# Patient Record
Sex: FEMALE | Race: WHITE
[De-identification: names, ages, dates, MRNs, and addresses within clinical notes are randomized per-mention and may not be internally consistent; named-entity substitution may affect disease eponyms.]

---

## 2022-06-22 ENCOUNTER — HOSPITAL ENCOUNTER (EMERGENCY)
Dept: HOSPITAL 76 - ED | Age: 72
Discharge: HOME | End: 2022-06-22
Payer: MEDICARE

## 2022-06-22 VITALS — SYSTOLIC BLOOD PRESSURE: 207 MMHG | DIASTOLIC BLOOD PRESSURE: 113 MMHG

## 2022-06-22 DIAGNOSIS — I16.0: Primary | ICD-10-CM

## 2022-06-22 LAB
ALBUMIN DIAFP-MCNC: 4.6 G/DL (ref 3.2–5.5)
ALBUMIN/GLOB SERPL: 1.3 {RATIO} (ref 1–2.2)
ALP SERPL-CCNC: 85 IU/L (ref 42–121)
ALT SERPL W P-5'-P-CCNC: 26 IU/L (ref 10–60)
ANION GAP SERPL CALCULATED.4IONS-SCNC: 8 MMOL/L (ref 6–13)
AST SERPL W P-5'-P-CCNC: 31 IU/L (ref 10–42)
BASOPHILS NFR BLD AUTO: 0 10^3/UL (ref 0–0.1)
BASOPHILS NFR BLD AUTO: 0.7 %
BILIRUB BLD-MCNC: 0.9 MG/DL (ref 0.2–1)
BUN SERPL-MCNC: 22 MG/DL (ref 6–20)
CALCIUM UR-MCNC: 9.5 MG/DL (ref 8.5–10.3)
CHLORIDE SERPL-SCNC: 98 MMOL/L (ref 101–111)
CO2 SERPL-SCNC: 31 MMOL/L (ref 21–32)
CREAT SERPLBLD-SCNC: 0.8 MG/DL (ref 0.4–1)
EOSINOPHIL # BLD AUTO: 0.2 10^3/UL (ref 0–0.7)
EOSINOPHIL NFR BLD AUTO: 3.3 %
ERYTHROCYTE [DISTWIDTH] IN BLOOD BY AUTOMATED COUNT: 12 % (ref 12–15)
GFRSERPLBLD MDRD-ARVRAT: 71 ML/MIN/{1.73_M2} (ref 89–?)
GLOBULIN SER-MCNC: 3.5 G/DL (ref 2.1–4.2)
GLUCOSE SERPL-MCNC: 101 MG/DL (ref 70–100)
HCT VFR BLD AUTO: 46.5 % (ref 37–47)
HGB UR QL STRIP: 15.6 G/DL (ref 12–16)
LYMPHOCYTES # SPEC AUTO: 1.8 10^3/UL (ref 1.5–3.5)
LYMPHOCYTES NFR BLD AUTO: 29.6 %
MCH RBC QN AUTO: 30.1 PG (ref 27–31)
MCHC RBC AUTO-ENTMCNC: 33.5 G/DL (ref 32–36)
MCV RBC AUTO: 89.8 FL (ref 81–99)
MONOCYTES # BLD AUTO: 0.6 10^3/UL (ref 0–1)
MONOCYTES NFR BLD AUTO: 9.6 %
NEUTROPHILS # BLD AUTO: 3.5 10^3/UL (ref 1.5–6.6)
NEUTROPHILS # SNV AUTO: 6.2 X10^3/UL (ref 4.8–10.8)
NEUTROPHILS NFR BLD AUTO: 56.5 %
NRBC # BLD AUTO: 0 /100WBC
NRBC # BLD AUTO: 0 X10^3/UL
PDW BLD AUTO: 9.1 FL (ref 7.9–10.8)
PLATELET # BLD: 288 10^3/UL (ref 130–450)
POTASSIUM SERPL-SCNC: 4.2 MMOL/L (ref 3.5–5)
PROT SPEC-MCNC: 8.1 G/DL (ref 6.7–8.2)
RBC MAR: 5.18 10^6/UL (ref 4.2–5.4)
SODIUM SERPLBLD-SCNC: 137 MMOL/L (ref 135–145)

## 2022-06-22 PROCEDURE — 96374 THER/PROPH/DIAG INJ IV PUSH: CPT

## 2022-06-22 PROCEDURE — 84484 ASSAY OF TROPONIN QUANT: CPT

## 2022-06-22 PROCEDURE — 80053 COMPREHEN METABOLIC PANEL: CPT

## 2022-06-22 PROCEDURE — 36415 COLL VENOUS BLD VENIPUNCTURE: CPT

## 2022-06-22 PROCEDURE — 99284 EMERGENCY DEPT VISIT MOD MDM: CPT

## 2022-06-22 PROCEDURE — 85025 COMPLETE CBC W/AUTO DIFF WBC: CPT

## 2022-06-22 PROCEDURE — 71045 X-RAY EXAM CHEST 1 VIEW: CPT

## 2022-06-22 PROCEDURE — 93005 ELECTROCARDIOGRAM TRACING: CPT

## 2022-06-22 PROCEDURE — 70450 CT HEAD/BRAIN W/O DYE: CPT

## 2022-06-22 NOTE — CT REPORT
PROCEDURE:  HEAD WO

 

INDICATIONS:  headache/HTN

 

TECHNIQUE:  

Noncontrast 4.5 mm thick angled axial sections acquired from the foramen magnum to the vertex.  For r
adiation dose reduction, the following was used:  automated exposure control, adjustment of mA and/or
 kV according to patient size.

 

COMPARISON:  None.

 

FINDINGS:  

Image quality:  Excellent.  

 

The ventricular system and cortical sulci demonstrate atrophy, consistent for patient's stated age.  
There are areas of hypodensity in the periventricular and subcortical white matter.  There is no acut
e intra or extra-axial fluid collection.  No acute hemorrhage, mass lesion or midline shift.  Brainst
em is unremarkable.  

Globes are symmetrical. Sinuses are aerated. Osseous structures are intact. 

 .  

 

IMPRESSION:  

 

1.  No acute intracranial process.

2.  Moderate atrophy and chronic microvascular ischemic changes.

 

Reviewed by: Saba Bhardwaj MD on 6/22/2022 8:21 PM PDT

Approved by: Saba Bhardwaj MD on 6/22/2022 8:21 PM PDT

 

 

Station ID:  [id number]

## 2022-06-22 NOTE — ED PHYSICIAN DOCUMENTATION
History of Present Illness





- Stated complaint


Stated Complaint: DIZZY/HBP





- Chief complaint


Chief Complaint: General





- History obtained from


History obtained from: Patient





- Additonal information


Additional information: 


Patient with no significant past medical history presenting for evaluation of 

dizziness and lightheadedness that has been intermittent for the last week along

with a mild frontal headache.  She went to the walk-in clinic for evaluation and

was noted to have significantly elevated blood pressure readings.  Patient 

reports going to a dental appointment a few years ago and had high blood 

pressure at that time but thought it was related to running late.  She has not 

previously been diagnosed with hypertension.  She denies visual disturbances, 

balance issues, chest pain, difficulty breathing, focal weakness, abdominal pain

or vomiting.








Review of Systems


Eyes: reports: Discharge.  denies: Decreased vision


Nose: denies: Congestion


Cardiac: denies: Chest pain / pressure


Respiratory: denies: Dyspnea


GI: denies: Vomiting


: denies: Dysuria


Musculoskeletal: denies: Neck pain, Back pain


Neurologic: reports: Headache.  denies: Focal weakness, Syncope





PD PAST MEDICAL HISTORY





- Present Medications


Home Medications: 


                                Ambulatory Orders











 Medication  Instructions  Recorded  Confirmed


 


amLODIPine [Norvasc] 10 mg PO DAILY #30 tablet 06/22/22 














- Allergies


Allergies/Adverse Reactions: 


                                    Allergies











Allergy/AdvReac Type Severity Reaction Status Date / Time


 


Sulfa (Sulfonamide AdvReac  Hives Verified 06/22/22 19:43





Antibiotics)     














PD ED PE NORMAL





- General


General: Alert and oriented X 3, No acute distress, Well developed/nourished





- HEENT


HEENT: Atraumatic, PERRL, EOMI





- Neck


Neck: Supple, no meningeal sign





- Cardiac


Cardiac: RRR, No murmur, Strong equal pulses





- Respiratory


Respiratory: No respiratory distress, Clear bilaterally





- Abdomen


Abdomen: Normal bowel sounds, Soft, Non tender, Non distended





- Back


Back: No CVA TTP





- Derm


Derm: Warm and dry





- Extremities


Extremities: No edema





- Neuro


Neuro: Alert and oriented X 3, CNs 2-12 intact, No motor deficit, No sensory 

deficit, Normal speech, Other (Normal finger-to-nose, rapid alternating 

movements, normal gait)





- Psych


Psych: Normal mood





Results





- Vitals


Vitals: 


                               Vital Signs - 24 hr











  06/22/22 06/22/22 06/22/22





  19:35 20:30 20:42


 


Temperature 36.5 C  


 


Heart Rate 86 86 72


 


Respiratory 14 20 17





Rate   


 


Blood Pressure 265/130 H 236/121 H 190/115 H


 


O2 Saturation 97 97 99














  06/22/22 06/22/22 06/22/22





  20:45 20:51 21:20


 


Temperature   


 


Heart Rate 72 74 69


 


Respiratory 20 15 17





Rate   


 


Blood Pressure 212/122 H 197/117 H 212/117 H


 


O2 Saturation 99 97 99














  06/22/22 06/22/22





  21:30 22:06


 


Temperature  


 


Heart Rate 68 68


 


Respiratory 14 32 H





Rate  


 


Blood Pressure 210/110 H 207/113 H


 


O2 Saturation 99 98








                                     Oxygen











O2 Source                      Room air

















- EKG (time done)


  ** 1958


Rate: Rate (enter#) (79)


Rhythm: NSR


Intervals: No: Prolonged QT


Ischemia: No: ST elevation c/w ischemia





- Labs


Labs: 


                                Laboratory Tests











  06/22/22 06/22/22 06/22/22





  20:20 20:20 20:20


 


WBC  6.2  


 


RBC  5.18  


 


Hgb  15.6  


 


Hct  46.5  


 


MCV  89.8  


 


MCH  30.1  


 


MCHC  33.5  


 


RDW  12.0  


 


Plt Count  288  


 


MPV  9.1  


 


Neut # (Auto)  3.5  


 


Lymph # (Auto)  1.8  


 


Mono # (Auto)  0.6  


 


Eos # (Auto)  0.2  


 


Baso # (Auto)  0.0  


 


Absolute Nucleated RBC  0.00  


 


Nucleated RBC %  0.0  


 


Sodium   137 


 


Potassium   4.2 


 


Chloride   98 L 


 


Carbon Dioxide   31 


 


Anion Gap   8.0 


 


BUN   22 H 


 


Creatinine   0.8 


 


Estimated GFR (MDRD)   71 L 


 


Glucose   101 H 


 


Calcium   9.5 


 


Total Bilirubin   0.9 


 


AST   31 


 


ALT   26 


 


Alkaline Phosphatase   85 


 


Troponin I High Sens    7.7


 


Total Protein   8.1 


 


Albumin   4.6 


 


Globulin   3.5 


 


Albumin/Globulin Ratio   1.3 














PD MEDICAL DECISION MAKING





- ED course


Complexity details: reviewed results, re-evaluated patient, d/w patient


ED course: 


Patient presenting for evaluation with severely elevated blood pressure macie

dings.  On exam, patient has no focal deficits and has no abnormal findings on 

cerebellar testing.CT scan was obtained and is negative for acute intracranial 

bleed.Labs also reviewed without significant abnormalities.  No signs of cardiac

ischemia.  Patient does not appear to be in heart failure.No signs of 

hypertensive emergency.  Patient was given a dose of labetalol with ~20% 

Lowering of blood pressure. Patient to be started on amlodipine.  She is aware 

of need for close follow-up with primary care doctor.  She is aware of strict 

return precautions.She is asymptomatic at time of discharge.








Departure





- Departure


Disposition: 01 Home, Self Care


Clinical Impression: 


 Hypertensive urgency





Condition: Stable


Instructions:  ED Hypertension New Begin Tx


Follow-Up: 


Ash Rendon MD [Physician No Access] - 


Prescriptions: 


amLODIPine [Norvasc] 10 mg PO DAILY #30 tablet


Comments: 


You were evaluated for significantly elevated blood pressure. This is related to

 a condition called hypertension. We have checked labs as well as a CT of your 

brain and chest xray with no significant abnormalities. We have started to lower

 Your blood pressure.  We do not want to lower your blood pressure too quickly 

and it should slowly lower over the next several days.I will send a prescription

 for blood pressure medication called amlodipine to the [facility] pharmacy in [place].  Please  this prescription and start taking it every day as 

prescribed.  You do also need close follow-up with your primary care doctor. I 

have included the name of her primary care doctor you can call tomorrow to try 

and establish care with.  You can also try at the walk-in clinic and should be 

seen within the next week.  If you have any worsening symptoms such as headache,

 weakness, dizziness, chest pain or trouble breathing please return to the 

emergency department. 


Discharge Date/Time: 06/22/22 22:10

## 2022-06-22 NOTE — XRAY REPORT
PROCEDURE:  Chest 1 View X-Ray

 

INDICATIONS:  dizzy

 

TECHNIQUE:  One view of the chest was acquired.  

 

COMPARISON:  None

 

FINDINGS:  

 

Surgical changes and devices:  None.  

 

Lungs and pleura:  No pleural effusions or pneumothorax.  Lungs are clear.  

 

Mediastinum:  Mediastinal contours appear normal.  Heart size is enlarged.

 

Bones and chest wall:  No suspicious bony lesions.  Overlying soft tissues appear unremarkable.  

 

IMPRESSION:  

No acute pulmonary process.

 

Reviewed by: Saba Bhardwaj MD on 6/22/2022 8:20 PM PDT

Approved by: Saba Bhardwaj MD on 6/22/2022 8:20 PM PDT

 

 

Station ID:  [id number]

## 2023-02-14 ENCOUNTER — HOSPITAL ENCOUNTER (OUTPATIENT)
Dept: HOSPITAL 76 - LAB.N | Age: 73
Discharge: HOME | End: 2023-02-14
Attending: NURSE PRACTITIONER
Payer: MEDICARE

## 2023-02-14 DIAGNOSIS — I10: Primary | ICD-10-CM

## 2023-02-14 DIAGNOSIS — Z13.220: ICD-10-CM

## 2023-02-14 LAB
CHOLEST SERPL-MCNC: 200 MG/DL
HDLC SERPL-MCNC: 82 MG/DL
HDLC SERPL: 2.4 {RATIO} (ref ?–4.4)
T4 FREE SERPL-MCNC: 0.62 NG/DL (ref 0.58–1.64)
TRIGL P FAST SERPL-MCNC: 36 MG/DL
TSH SERPL-ACNC: 8.2 UIU/ML (ref 0.34–5.6)

## 2023-02-14 PROCEDURE — 84443 ASSAY THYROID STIM HORMONE: CPT

## 2023-02-14 PROCEDURE — 84439 ASSAY OF FREE THYROXINE: CPT

## 2023-02-14 PROCEDURE — 80061 LIPID PANEL: CPT

## 2023-02-14 PROCEDURE — 36415 COLL VENOUS BLD VENIPUNCTURE: CPT

## 2023-02-14 PROCEDURE — 83721 ASSAY OF BLOOD LIPOPROTEIN: CPT

## 2023-02-28 ENCOUNTER — HOSPITAL ENCOUNTER (OUTPATIENT)
Dept: HOSPITAL 76 - DI | Age: 73
Discharge: HOME | End: 2023-02-28
Attending: NURSE PRACTITIONER
Payer: MEDICARE

## 2023-02-28 DIAGNOSIS — Q21.12: ICD-10-CM

## 2023-02-28 DIAGNOSIS — I87.8: Primary | ICD-10-CM

## 2023-02-28 DIAGNOSIS — I10: ICD-10-CM

## 2023-02-28 PROCEDURE — 93306 TTE W/DOPPLER COMPLETE: CPT

## 2023-06-22 ENCOUNTER — HOSPITAL ENCOUNTER (OUTPATIENT)
Dept: HOSPITAL 76 - EMS | Age: 73
Discharge: ANOTHER HOSPITAL | End: 2023-06-22
Payer: MEDICARE
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
## 2023-06-22 DIAGNOSIS — M25.551: ICD-10-CM
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
## 2023-06-22 DIAGNOSIS — M25.531: ICD-10-CM

## 2023-06-22 DIAGNOSIS — S70.11XA: ICD-10-CM
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
## 2023-06-22 DIAGNOSIS — S00.83XA: Primary | ICD-10-CM

## 2023-08-01 ENCOUNTER — HOSPITAL ENCOUNTER (OUTPATIENT)
Dept: HOSPITAL 76 - DI.WOS | Age: 73
Discharge: HOME | End: 2023-08-01
Attending: ORTHOPAEDIC SURGERY
Payer: MEDICARE

## 2023-08-01 DIAGNOSIS — S52.501D: Primary | ICD-10-CM

## 2023-08-01 NOTE — XRAY REPORT
PROCEDURE:  Wrist 3 View RT

 

INDICATIONS: RIGHT WRIST PAIN

 

TECHNIQUE:  3 views of the wrist were acquired.  

 

COMPARISON:  6/22/2023

 

FINDINGS:  

 

Bones:  Healing impacted distal radius fracture with increased fracture lucency indicate prior study.


 

Soft tissues:  No suspicious soft tissue calcifications or masses.  

 

 

IMPRESSION:  

Healing impacted distal radius fracture. No new fractures.

 

 

 

Reviewed by: Mark Anthony Dumont MD on 8/1/2023 6:16 PM PDT

Approved by: Mark Anthony Dumont MD on 8/1/2023 6:16 PM PDT

 

 

Station ID:  [id number]

## 2024-09-24 ENCOUNTER — HOSPITAL ENCOUNTER (OUTPATIENT)
Dept: HOSPITAL 76 - SDS | Age: 74
Discharge: HOME | End: 2024-09-24
Attending: SURGERY
Payer: MEDICARE

## 2024-09-24 VITALS — OXYGEN SATURATION: 98 %

## 2024-09-24 VITALS — SYSTOLIC BLOOD PRESSURE: 154 MMHG | DIASTOLIC BLOOD PRESSURE: 82 MMHG

## 2024-09-24 DIAGNOSIS — K62.89: ICD-10-CM

## 2024-09-24 DIAGNOSIS — R19.5: ICD-10-CM

## 2024-09-24 DIAGNOSIS — Z12.11: Primary | ICD-10-CM

## 2024-09-24 RX ADMIN — SODIUM CHLORIDE, POTASSIUM CHLORIDE, SODIUM LACTATE AND CALCIUM CHLORIDE ONE MLS: 600; 310; 30; 20 INJECTION, SOLUTION INTRAVENOUS at 13:53

## 2024-09-24 RX ADMIN — SODIUM CHLORIDE, POTASSIUM CHLORIDE, SODIUM LACTATE AND CALCIUM CHLORIDE ONE MLS: 600; 310; 30; 20 INJECTION, SOLUTION INTRAVENOUS at 10:29

## 2024-09-24 NOTE — ANESTHESIA
Pre-Anesthesia VS, & Labs





- Diagnosis


pos cologuard





- Procedure





colonoscopy


Vital Signs: 





                                        











Temp Pulse Resp BP Pulse Ox O2 Flow Rate


 


 36.3 C L  77   14   174/84 H  99    


 


 09/24/24 10:50  09/24/24 10:50  09/24/24 10:50  09/24/24 10:50  09/24/24 10:50 

 











Height: 5 ft 4 in


Weight (kg): 55 kg


Body Mass Index: 20.8


BMI Classification: Normal





- NPO


>8 hours





- Pregnancy


Is Patient Pregnant?: No





Home Medications and Allergies


Home Medications: 


Ambulatory Orders





Losartan Potassium 62.5 mg PO HS 09/23/24 











                                        





Losartan Potassium 62.5 mg PO HS 09/23/24 








Allergies/Adverse Reactions: 


                                    Allergies











Allergy/AdvReac Type Severity Reaction Status Date / Time


 


Sulfa (Sulfonamide Allergy Intermediate Hives Verified 09/24/24 11:05





Antibiotics)     


 


amlodipine AdvReac Mild Edema Verified 09/24/24 11:05














Anes History & Medical History





- Anesthetic History


Anesthesia Complications: reports: No previous complications


Family history of Anesthesia Complications: Denies


Family history of Malignant Hyperthermia: Denies





- Medical History


Cardiovascular: reports: Hypertension


Pulmonary: reports: None


Gastrointestinal: reports: None


Urinary: reports: None


Musculoskeletal: reports: None


Endocrine/Autoimmune: reports: None


Skin: reports: Rosacea





Exam


General: Alert, Oriented x3, Cooperative


Dental: WNL


Mouth Opening: 3 Fingerbreadth


Neck Mobility: Normal


Mallampati classification: I


Thyromental Distance: 4-6 cm


Respiratory: Lungs clear


Cardiovascular: Regular rate





Plan


Anesthesia Type: General, Total IV


Consent for Procedure(s) Verified and Reviewed: Yes


Code Status: Attempt Resuscitation


ASA classification: 2-Mild systemic disease


Is this case an emergency?: No

## 2024-09-24 NOTE — ANESTHESIA POST OP EVALUATION
Anesthesia Post Eval





- Post Anesthesia Eval


Vitals: 





                                Last Vital Signs











Temp  36.9 C   09/24/24 13:53


 


Pulse  71   09/24/24 14:18


 


Resp  18   09/24/24 14:18


 


BP  154/82 H  09/24/24 14:18


 


Pulse Ox  98   09/24/24 14:18


 


O2 Flow Rate      











CV Function Including HR & BP: Stable


Pain Control: Satisfactory


Nausea & Vomiting: Negative


Mental Status: Baseline


Respiratory Status: Airway Patent


Hydration Status: Satisfactory


Anesthesia Complications: None